# Patient Record
Sex: MALE | Race: WHITE | NOT HISPANIC OR LATINO | Employment: FULL TIME | URBAN - METROPOLITAN AREA
[De-identification: names, ages, dates, MRNs, and addresses within clinical notes are randomized per-mention and may not be internally consistent; named-entity substitution may affect disease eponyms.]

---

## 2024-02-02 ENCOUNTER — HOSPITAL ENCOUNTER (EMERGENCY)
Facility: MEDICAL CENTER | Age: 49
End: 2024-02-03
Attending: EMERGENCY MEDICINE
Payer: COMMERCIAL

## 2024-02-02 ENCOUNTER — APPOINTMENT (OUTPATIENT)
Dept: RADIOLOGY | Facility: MEDICAL CENTER | Age: 49
End: 2024-02-02
Attending: EMERGENCY MEDICINE
Payer: COMMERCIAL

## 2024-02-02 DIAGNOSIS — F23 ACUTE PSYCHOSIS (HCC): ICD-10-CM

## 2024-02-02 DIAGNOSIS — G92.8 DRUG-INDUCED ENCEPHALOPATHY: ICD-10-CM

## 2024-02-02 LAB
ALBUMIN SERPL BCP-MCNC: 4.5 G/DL (ref 3.2–4.9)
ALBUMIN/GLOB SERPL: 1.7 G/DL
ALP SERPL-CCNC: 75 U/L (ref 30–99)
ALT SERPL-CCNC: 19 U/L (ref 2–50)
AMPHET UR QL SCN: NEGATIVE
ANION GAP SERPL CALC-SCNC: 13 MMOL/L (ref 7–16)
AST SERPL-CCNC: 22 U/L (ref 12–45)
BARBITURATES UR QL SCN: NEGATIVE
BASOPHILS # BLD AUTO: 0.5 % (ref 0–1.8)
BASOPHILS # BLD: 0.03 K/UL (ref 0–0.12)
BENZODIAZ UR QL SCN: NEGATIVE
BILIRUB SERPL-MCNC: 0.5 MG/DL (ref 0.1–1.5)
BUN SERPL-MCNC: 8 MG/DL (ref 8–22)
BZE UR QL SCN: NEGATIVE
CALCIUM ALBUM COR SERPL-MCNC: 8.8 MG/DL (ref 8.5–10.5)
CALCIUM SERPL-MCNC: 9.2 MG/DL (ref 8.5–10.5)
CANNABINOIDS UR QL SCN: POSITIVE
CHLORIDE SERPL-SCNC: 103 MMOL/L (ref 96–112)
CO2 SERPL-SCNC: 24 MMOL/L (ref 20–33)
CREAT SERPL-MCNC: 0.85 MG/DL (ref 0.5–1.4)
EOSINOPHIL # BLD AUTO: 0.01 K/UL (ref 0–0.51)
EOSINOPHIL NFR BLD: 0.2 % (ref 0–6.9)
ERYTHROCYTE [DISTWIDTH] IN BLOOD BY AUTOMATED COUNT: 41.8 FL (ref 35.9–50)
ETHANOL BLD-MCNC: <10.1 MG/DL
FENTANYL UR QL: NEGATIVE
GFR SERPLBLD CREATININE-BSD FMLA CKD-EPI: 108 ML/MIN/1.73 M 2
GLOBULIN SER CALC-MCNC: 2.7 G/DL (ref 1.9–3.5)
GLUCOSE SERPL-MCNC: 99 MG/DL (ref 65–99)
HCT VFR BLD AUTO: 40.2 % (ref 42–52)
HGB BLD-MCNC: 14.2 G/DL (ref 14–18)
IMM GRANULOCYTES # BLD AUTO: 0.01 K/UL (ref 0–0.11)
IMM GRANULOCYTES NFR BLD AUTO: 0.2 % (ref 0–0.9)
LYMPHOCYTES # BLD AUTO: 2.11 K/UL (ref 1–4.8)
LYMPHOCYTES NFR BLD: 35.6 % (ref 22–41)
MCH RBC QN AUTO: 32.3 PG (ref 27–33)
MCHC RBC AUTO-ENTMCNC: 35.3 G/DL (ref 32.3–36.5)
MCV RBC AUTO: 91.4 FL (ref 81.4–97.8)
METHADONE UR QL SCN: NEGATIVE
MONOCYTES # BLD AUTO: 0.61 K/UL (ref 0–0.85)
MONOCYTES NFR BLD AUTO: 10.3 % (ref 0–13.4)
NEUTROPHILS # BLD AUTO: 3.16 K/UL (ref 1.82–7.42)
NEUTROPHILS NFR BLD: 53.2 % (ref 44–72)
NRBC # BLD AUTO: 0 K/UL
NRBC BLD-RTO: 0 /100 WBC (ref 0–0.2)
OPIATES UR QL SCN: NEGATIVE
OXYCODONE UR QL SCN: NEGATIVE
PCP UR QL SCN: NEGATIVE
PLATELET # BLD AUTO: 210 K/UL (ref 164–446)
PMV BLD AUTO: 9.6 FL (ref 9–12.9)
POTASSIUM SERPL-SCNC: 3.5 MMOL/L (ref 3.6–5.5)
PROPOXYPH UR QL SCN: NEGATIVE
PROT SERPL-MCNC: 7.2 G/DL (ref 6–8.2)
RBC # BLD AUTO: 4.4 M/UL (ref 4.7–6.1)
SODIUM SERPL-SCNC: 140 MMOL/L (ref 135–145)
TSH SERPL DL<=0.005 MIU/L-ACNC: 1.89 UIU/ML (ref 0.38–5.33)
WBC # BLD AUTO: 5.9 K/UL (ref 4.8–10.8)

## 2024-02-02 PROCEDURE — 96372 THER/PROPH/DIAG INJ SC/IM: CPT

## 2024-02-02 PROCEDURE — 80307 DRUG TEST PRSMV CHEM ANLYZR: CPT

## 2024-02-02 PROCEDURE — 80053 COMPREHEN METABOLIC PANEL: CPT

## 2024-02-02 PROCEDURE — 85025 COMPLETE CBC W/AUTO DIFF WBC: CPT

## 2024-02-02 PROCEDURE — 700111 HCHG RX REV CODE 636 W/ 250 OVERRIDE (IP): Performed by: EMERGENCY MEDICINE

## 2024-02-02 PROCEDURE — 84443 ASSAY THYROID STIM HORMONE: CPT

## 2024-02-02 PROCEDURE — 73110 X-RAY EXAM OF WRIST: CPT | Mod: LT

## 2024-02-02 PROCEDURE — 70450 CT HEAD/BRAIN W/O DYE: CPT

## 2024-02-02 PROCEDURE — 99285 EMERGENCY DEPT VISIT HI MDM: CPT

## 2024-02-02 PROCEDURE — 82077 ASSAY SPEC XCP UR&BREATH IA: CPT

## 2024-02-02 PROCEDURE — 36415 COLL VENOUS BLD VENIPUNCTURE: CPT

## 2024-02-02 RX ORDER — ZIPRASIDONE MESYLATE 20 MG/ML
20 INJECTION, POWDER, LYOPHILIZED, FOR SOLUTION INTRAMUSCULAR ONCE
Status: COMPLETED | OUTPATIENT
Start: 2024-02-02 | End: 2024-02-02

## 2024-02-02 RX ORDER — ZIPRASIDONE MESYLATE 20 MG/ML
20 INJECTION, POWDER, LYOPHILIZED, FOR SOLUTION INTRAMUSCULAR
Status: DISCONTINUED | OUTPATIENT
Start: 2024-02-02 | End: 2024-02-03 | Stop reason: HOSPADM

## 2024-02-02 RX ADMIN — ZIPRASIDONE MESYLATE 20 MG: 20 INJECTION, POWDER, LYOPHILIZED, FOR SOLUTION INTRAMUSCULAR at 15:47

## 2024-02-02 NOTE — ED NOTES
RN found pts wife phone number in his cell phone and wife called. Wife reporting they live in [place] and he came here approx 1 week ago to hopefully get a job at [employer] Wife denies psych hx. Cris Orosco [phone]

## 2024-02-02 NOTE — ED NOTES
Pt DWIGHT CELIS from [facility] Parking lot where he was running around naked yelling at people. EMS was called. Pt ran from the police. Pt was restrained. Pt arrives here not answering any questions only yelling at staff. Pt anxious.

## 2024-02-02 NOTE — ED NOTES
PATIENT UP IN ROOM NAKED AND REFUSES TO WEAR GOWN OR COVER UP WITH BLANKET. PATIENT FILLING WATER CUP IN SINK AND DRINKING OUT OF SINK.

## 2024-02-02 NOTE — ED NOTES
PATIENT GIVEN URINAL AND WAS USING IT. PT THEN BEGAN TO DRINK HIS OWN URINE. OFFICERS INTERCEPTED URINE AND REDIRECTED PT TO NOT DRINK HIS OWN URINE. PT VERBALIZED AN UNDERSTANDING.     ABLE TO COLLECT UA SAMPLE FROM LEFT OVER URINE.

## 2024-02-02 NOTE — ED NOTES
Security at bedside. Pt naked. Only belongings given to me by EMS is a cell phone. POC breathalyzer is 0.00

## 2024-02-02 NOTE — ED PROVIDER NOTES
ED Provider Note    CHIEF COMPLAINT  Chief Complaint   Patient presents with    ALOC       EXTERNAL RECORDS REVIEWED  None available    HPI/ROS  LIMITATION TO HISTORY   Select: Altered mental status / Confusion  OUTSIDE HISTORIAN(S):  Wife via telephone    Cheese Seventy-Nine is a 89 y.o. adult who presents with abnormal behavior, he was apparently found running around naked, he attempted to evade police and he was ultimately apprehended.  He is brought in here.  Wife was contacted, she reports that they live in [place], he is here interviewing for a job at [employer].  He does not use drugs.  He has no psychiatric history.  The patient does have a certain degree of psychomotor agitation, he seems to be altered and really does not provide much meaningful history    PAST MEDICAL HISTORY       SURGICAL HISTORY   has a past surgical history that includes coronary artery bypass, 1.    FAMILY HISTORY  No family history on file.    SOCIAL HISTORY  Social History     Tobacco Use    Smoking status: Not on file    Smokeless tobacco: Not on file   Substance and Sexual Activity    Alcohol use: Not on file    Drug use: Not on file    Sexual activity: Not on file       CURRENT MEDICATIONS  Home Medications    **Home medications have not yet been reviewed for this encounter**         ALLERGIES  Not on File    PHYSICAL EXAM  VITAL SIGNS: BP (!) 140/96   Pulse 96   Resp 20   Wt 113 kg (250 lb)   SpO2 95%    Constitutional: Awake, alert, psychomotor agitation  HENT: Normocephalic, no obvious evidence of acute trauma.  Eyes: No scleral icterus. Normal conjunctiva   Thorax & Lungs: Minimally tachycardic, some ecchymosis to the right lateral chest wall with an overlying abrasion otherwise symmetric and clear breath sounds bilaterally  Abdomen: The abdomen is not visibly distended. Upon palpation, I find it to be without tenderness.  No mass appreciated.  Skin: The exposed portions of skin reveal no obvious rash or other  abnormalities.  Extremities/Musculoskeletal: No obvious sign of acute trauma. No asymmetric calf tenderness or edema.   Neurologic: Awake, alert, obviously moves all 4 extremities with apparent normal strength    DIAGNOSTIC STUDIES / PROCEDURES    LABS  Results for orders placed or performed during the hospital encounter of 02/02/24   URINE DRUG SCREEN   Result Value Ref Range    Amphetamines Urine Negative Negative    Barbiturates Negative Negative    Benzodiazepines Negative Negative    Cocaine Metabolite Negative Negative    Fentanyl, Urine Negative Negative    Methadone Negative Negative    Opiates Negative Negative    Oxycodone Negative Negative    Phencyclidine -Pcp Negative Negative    Propoxyphene Negative Negative    Cannabinoid Metab Positive (A) Negative        RADIOLOGY  I have independently interpreted the diagnostic imaging associated with this visit and am waiting the final reading from the radiologist.   My preliminary interpretation is as follows: No ICH  Radiologist interpretation:   CT-HEAD W/O   Final Result      Head CT without contrast within normal limits. No evidence of acute cerebral infarction, hemorrhage or mass lesion.               COURSE & MEDICAL DECISION MAKING    ED Observation Status? Yes; I am placing the patient in to an observation status due to a diagnostic uncertainty as well as therapeutic intensity. Patient placed in observation status at 3:07 PM, 2/2/2024.     Observation plan is as follows: CT, drug screen, observation and psychiatric evaluation      INITIAL ASSESSMENT, COURSE AND PLAN  Care Narrative: This is a [id number]-old male who comes in acutely psychotic, by history obtained from the wife, he resides in [place] with his family, he is here interviewing for a job at [employer].  He was found running naked somewhere and apprehended by police.  The patient seems to be under the influence of some sort of intoxicant, he has very bizarre behavior and a certain degree of  psychomotor agitation but has no obvious focal neurologic deficits.  The plan is to use Geodon for sedation as he is interfering with his medical care and is posing a threat to his own safety and the safety of staff.  Will obtain a head CT and a drug screen.  He will be watched very closely and reevaluated all    7:39 PM patient reevaluated, is improved but still is behaving quite abnormally.  CT is negative.  Drug screen is positive for cannabinoids but no other intoxicants.  I talked to Michel, his brother.  He confirms that the patient has no psychiatric illness, there is a family history of psychiatric disease but never with the patient.  At this point, his legal hold is going to be continued overnight.  The plan will be for psychiatric evaluation in the morning.  We kept in ED observation overnight and signed out to the oncoming ERP.  I will put a single dose of Geodon intramuscular injection as a as needed order if he becomes agitated again      CRITICAL CARE  The very real possibilty of a deterioration of this patient's condition required the highest level of my preparedness for sudden, emergent intervention.  The patient required sedation with Geodon given his agitation, interference with medical care and his danger to himself and staff.  I provided critical care services, which included medication orders, frequent reevaluations of the patient's condition and response to treatment, ordering and reviewing test results, and discussing the case with various consultants.  The critical care time associated with the care of the patient was 39 minutes. Review chart for interventions. This time is exclusive of any other billable procedures.      Working diagnosis  1. Acute psychosis (HCC)           Electronically signed by: Jung Collins M.D., 2/2/2024 3:49 PM

## 2024-02-03 VITALS
BODY MASS INDEX: 37.89 KG/M2 | SYSTOLIC BLOOD PRESSURE: 157 MMHG | DIASTOLIC BLOOD PRESSURE: 89 MMHG | TEMPERATURE: 97.3 F | WEIGHT: 250 LBS | HEIGHT: 68 IN | RESPIRATION RATE: 14 BRPM | HEART RATE: 63 BPM | OXYGEN SATURATION: 95 %

## 2024-02-03 PROCEDURE — 96372 THER/PROPH/DIAG INJ SC/IM: CPT

## 2024-02-03 PROCEDURE — 700111 HCHG RX REV CODE 636 W/ 250 OVERRIDE (IP): Mod: JZ | Performed by: EMERGENCY MEDICINE

## 2024-02-03 RX ORDER — HALOPERIDOL 5 MG/ML
5 INJECTION INTRAMUSCULAR ONCE
Status: COMPLETED | OUTPATIENT
Start: 2024-02-03 | End: 2024-02-03

## 2024-02-03 RX ORDER — LORAZEPAM 2 MG/ML
2 INJECTION INTRAMUSCULAR ONCE
Status: COMPLETED | OUTPATIENT
Start: 2024-02-03 | End: 2024-02-03

## 2024-02-03 RX ORDER — DIPHENHYDRAMINE HYDROCHLORIDE 50 MG/ML
50 INJECTION INTRAMUSCULAR; INTRAVENOUS ONCE
Status: COMPLETED | OUTPATIENT
Start: 2024-02-03 | End: 2024-02-03

## 2024-02-03 RX ADMIN — DIPHENHYDRAMINE HYDROCHLORIDE 50 MG: 50 INJECTION, SOLUTION INTRAMUSCULAR; INTRAVENOUS at 02:45

## 2024-02-03 RX ADMIN — LORAZEPAM 2 MG: 2 INJECTION INTRAMUSCULAR; INTRAVENOUS at 02:47

## 2024-02-03 RX ADMIN — HALOPERIDOL LACTATE 5 MG: 5 INJECTION, SOLUTION INTRAMUSCULAR at 02:47

## 2024-02-03 NOTE — ED NOTES
PT IS SLEEPING. BREATHING IS EVEN AND UNLABORED, SKIN IS WARM AND DRY, VSS, NAD, MONITORING ONGOING.

## 2024-02-03 NOTE — ED NOTES
Checked on bed, with unlabored respirations. No safety risk noted  Sleeping  Sitter - 1:1 with continuous visual monitoring by Trained Personnel  Continued safety precaution  Josérfredis in low position, side rail up for pt safety.   No needs identified at the moment

## 2024-02-03 NOTE — ED NOTES
Pt was found doing [employer] live, security went inside the pt's room and confiscated the phone. Informed pt it was inappropriate. Charge Nurse Kimber moses

## 2024-02-03 NOTE — ED NOTES
Patient complaining of left wrist pain after being in hand cuffs earlier. ERP notified. Xray ordered.     Per ALERT ANDRES De La Paz, patient may have personal phone and corded call light.

## 2024-02-03 NOTE — DISCHARGE SUMMARY
"  ED Observation Discharge Summary    Patient:Al Orosco  Patient : 1975  Patient MRN: [id number]  Patient PCP: Pcp Pt States None    Admit Date: 2024  Discharge Date and Time: 24 9:21 AM  Discharge Diagnosis:   1. Acute psychosis (HCC)    2. Drug-induced encephalopathy        Discharge Attending: Kelvin Martinez M.D.  Discharge Service: ED Observation    ED Course  Al is a 48 y.o. male who was evaluated at [facility] for bizarre behavior.  Patient apparently ate multiple marijuana edibles and developed acute psychosis from this.  Had bizarre behavior and was brought in for this, throughout my shift patient continued to clear from psychiatric respective.  He no longer has any auditory, visual hallucinations, or delusions.  He does not recall the events of last night.  He works at [employer], he has no complaints, he denies any SI or HI.  Patient does not appear to be an acute risk to himself or others.  He did not appear to be acutely delusional at this point.  Patient discharged.  Behavioral health team recommended discharge    Discharge Exam:  BP (!) 120/100   Pulse 66   Temp 36.3 °C (97.3 °F) (Temporal)   Resp 17   Ht 1.727 m (5' 8\")   Wt 113 kg (250 lb)   SpO2 97%   BMI 38.01 kg/m² .    Constitutional: Awake and alert. Nontoxic  HENT:  Grossly normal  Eyes: Grossly normal  Neck: Normal range of motion  Cardiovascular: Normal heart rate   Thorax & Lungs: No respiratory distress  Abdomen: Nontender  Skin:  No pathologic rash.   Extremities: Well perfused  Psychiatric: Affect normal    Labs  Results for orders placed or performed during the hospital encounter of 24   URINE DRUG SCREEN   Result Value Ref Range    Amphetamines Urine Negative Negative    Barbiturates Negative Negative    Benzodiazepines Negative Negative    Cocaine Metabolite Negative Negative    Fentanyl, Urine Negative Negative    Methadone Negative Negative    Opiates Negative Negative    Oxycodone Negative Negative    " Phencyclidine -Pcp Negative Negative    Propoxyphene Negative Negative    Cannabinoid Metab Positive (A) Negative   CBC WITH DIFFERENTIAL   Result Value Ref Range    WBC 5.9 4.8 - 10.8 K/uL    RBC 4.40 (L) 4.70 - 6.10 M/uL    Hemoglobin 14.2 14.0 - 18.0 g/dL    Hematocrit 40.2 (L) 42.0 - 52.0 %    MCV 91.4 81.4 - 97.8 fL    MCH 32.3 27.0 - 33.0 pg    MCHC 35.3 32.3 - 36.5 g/dL    RDW 41.8 35.9 - 50.0 fL    Platelet Count 210 164 - 446 K/uL    MPV 9.6 9.0 - 12.9 fL    Neutrophils-Polys 53.20 44.00 - 72.00 %    Lymphocytes 35.60 22.00 - 41.00 %    Monocytes 10.30 0.00 - 13.40 %    Eosinophils 0.20 0.00 - 6.90 %    Basophils 0.50 0.00 - 1.80 %    Immature Granulocytes 0.20 0.00 - 0.90 %    Nucleated RBC 0.00 0.00 - 0.20 /100 WBC    Neutrophils (Absolute) 3.16 1.82 - 7.42 K/uL    Lymphs (Absolute) 2.11 1.00 - 4.80 K/uL    Monos (Absolute) 0.61 0.00 - 0.85 K/uL    Eos (Absolute) 0.01 0.00 - 0.51 K/uL    Baso (Absolute) 0.03 0.00 - 0.12 K/uL    Immature Granulocytes (abs) 0.01 0.00 - 0.11 K/uL    NRBC (Absolute) 0.00 K/uL   COMP METABOLIC PANEL   Result Value Ref Range    Sodium 140 135 - 145 mmol/L    Potassium 3.5 (L) 3.6 - 5.5 mmol/L    Chloride 103 96 - 112 mmol/L    Co2 24 20 - 33 mmol/L    Anion Gap 13.0 7.0 - 16.0    Glucose 99 65 - 99 mg/dL    Bun 8 8 - 22 mg/dL    Creatinine 0.85 0.50 - 1.40 mg/dL    Calcium 9.2 8.5 - 10.5 mg/dL    Correct Calcium 8.8 8.5 - 10.5 mg/dL    AST(SGOT) 22 12 - 45 U/L    ALT(SGPT) 19 2 - 50 U/L    Alkaline Phosphatase 75 30 - 99 U/L    Total Bilirubin 0.5 0.1 - 1.5 mg/dL    Albumin 4.5 3.2 - 4.9 g/dL    Total Protein 7.2 6.0 - 8.2 g/dL    Globulin 2.7 1.9 - 3.5 g/dL    A-G Ratio 1.7 g/dL   DIAGNOSTIC ALCOHOL   Result Value Ref Range    Diagnostic Alcohol <10.1 <10.1 mg/dL   TSH WITH REFLEX TO FT4   Result Value Ref Range    TSH 1.890 0.380 - 5.330 uIU/mL   ESTIMATED GFR   Result Value Ref Range    GFR (CKD-EPI) 108 >60 mL/min/1.73 m 2       Radiology  DX-WRIST-COMPLETE 3+ LEFT   Final  Result         No acute osseous abnormality.      CT-HEAD W/O   Final Result      Head CT without contrast within normal limits. No evidence of acute cerebral infarction, hemorrhage or mass lesion.             Medications:   New Prescriptions    No medications on file       My final assessment includes   1. Acute psychosis (HCC)    2. Drug-induced encephalopathy      Upon Reevaluation, the patient's condition has: Improved; and will be discharged.    Patient discharged from ED Observation status at 9:31 AM (Time) 02/03/24 (Date).     Total time spent on this ED Observation discharge encounter is > 30 Minutes    Electronically signed by: Kelvin Martinez M.D., 2/3/2024 9:21 AM

## 2024-02-03 NOTE — DISCHARGE PLANNING
Alert Team:    Pt placed on Legal Hold per ERP for inability to care for self. Pt is now cooperative, able answer basic questions, however, still somewhat altered. Per pts brother, this type of behavior is not normal for patient. At this point, ERP feels it is best to monitor patient and have him to continue to metabolize potential drug. IP psych consult ordered.    Brain Alvarez RN, ALEX

## 2024-02-03 NOTE — ED NOTES
Pt rounding, pt awake, watching tv. Not in any form of distress, no other identified request noted.     Sitter outside the room  1:1 closed observation of the pt

## 2024-02-03 NOTE — ED NOTES
Checked on bed, with unlabored respirations. Pt sleeping.   Sitter - 1:1 with continuous visual monitoring by Trained Personnel  Continued safety precaution  Josérfredis in low position, side rail up for pt safety.   No needs identified at the moment

## 2024-02-03 NOTE — ED PROVIDER NOTES
ED signout note: Following signout patient has apparently had on behavior that is consistent with his initial assessment where he was apparently acting odd and elevated police and had apparently moved here to get a job at [employer].  He does not have a history of drug use or alcohol abuse but has some elements of family psychiatric illness as described by his brother.  This is out of proportion to his normal baseline per his wife.  Urine drug screen was unremarkable, did not appear clinically intoxicated, head CT without any signs of mass.  Cannabinoids in his urine drug screen could point to some of the more psychoactive substance since such as spice.  He continues to have odd affect following signout and persistence of placement and psychiatric evaluation I have obtained some basic labs electrolytes and thyroid panel for further medical clearance.    Labs are reviewed:  Labs Reviewed   URINE DRUG SCREEN - Abnormal; Notable for the following components:       Result Value    Cannabinoid Metab Positive (*)     All other components within normal limits   CBC WITH DIFFERENTIAL - Abnormal; Notable for the following components:    RBC 4.40 (*)     Hematocrit 40.2 (*)     All other components within normal limits   COMP METABOLIC PANEL - Abnormal; Notable for the following components:    Potassium 3.5 (*)     All other components within normal limits   DIAGNOSTIC ALCOHOL   TSH WITH REFLEX TO FT4   ESTIMATED GFR     Following this, pt remains intermittently agitated, was noted at 3 AM to be talking to himself, showing signs of acute psychosis and was not following commands and needed to be medically sedated with Haldol, Benadryl, Ativan.    I discussed case with ED behavioral health team who is already placed consultation for inpatient psychiatry to see the patient with plans on reaching out to family members, serial reassessments and the possibility of being discharged home versus inpatient assessment.

## 2024-02-03 NOTE — ED NOTES
Bedside report received from off going RN Ashia, assumed care of patient.  POC discussed with patient. Call light within reach, all needs addressed at this time.       Fall risk interventions in place: Not Applicable (all applicable per [place] Fall risk assessment)   Continuous monitoring: Not Applicable   IVF/IV medications: Not Applicable   Oxygen: Room Air  Bedside sitter: Pt on L2k SI with 1:1 sitter Allyson (name), Report given to sitter, checklist completed, and checklist completed, stop sign in doorway  Isolation: Not Applicable

## 2024-02-03 NOTE — ED NOTES
Informed Dr Serrano pt wanting to go out from his room, wanting to go home.  Been talking flight of ideas.Called security    Sitter 1:1 in direct view of the pt

## 2024-02-03 NOTE — ED NOTES
Given due medications with the security at bedside, but pt did cooperate while this RN and [facility] EMT Yolanda gave the medication intramuscularly.

## 2024-02-03 NOTE — DISCHARGE PLANNING
"ALERT team  note:  48 year old male admitted 2/4/24, legal hold, inability to care for self; this AM,  pt alert, oriented x 4; calm; cooperative; pleasant; with organized thoughts and behaviors; no delusions, paranoia, hallucinations noted; insight, judgment adequate; currently denies SI, HI, or self-harm ideation; future-oriented; denies h/o self-harm or suicide attempt; states he recently moved to [place] from [place] approx 1 week ago for a new job opportunity at [employer]; states he is residing in a rental property and his wife and children are still in [place] until their house there is sold, and then they will relocate here; states he ingested an THC \"edibles\" yesterday to relax and does not remember much of his disorganized thoughts and behaviors from yesterday; other than the edibles yesterday, pt denies current substance use; UDS + THC; states current psych med includes Cymbalta 60 mg PO daily which he is compliant with; denies current outpt MH providers or h/o inpt MH/CD tx; states today he plans to retrieve his car, go home, and rest;  writer RN updated [facility] ERP Dr. Martinez re: pt's current status; writer RN reviewed community MH resources with  pt, with written information given, including [facility],  [facility], [facility], [facility] (for future resources if he receives insurance as an employee at [employer]),, and [facility], [facility], [facility], [facility]. As pt does not currently have an active insurance plan; pt verbalized understanding; pt to DC to self today to his car with a taxi voucher given  "

## 2024-02-03 NOTE — DISCHARGE INSTRUCTIONS
You develop psychosis last night secondary to severe marijuana intoxication.  Thankfully your workup was reassuring.  I would recommend not taking edibles in the future.

## 2024-02-03 NOTE — ED NOTES
Bedside report received from off going RN: Nurys, assumed care of patient.      Fall risk interventions in place: Not Applicable (all applicable per [place] Fall risk assessment)   Continuous monitoring: Not Applicable   IVF/IV medications: Not Applicable   Oxygen: Room Air  Bedside sitter: Report given to sitter for safety, not on L2K.   Isolation: Not Applicable

## 2024-02-03 NOTE — ED TRIAGE NOTES
"Chief Complaint   Patient presents with    ALOC     PT WIFE STATES PT SHOWED UP AT HOME AND WAS ACTING STRANGE AND WAS NAKED. WIFE HAS NO CLUE WHY PATIENT IS ACTING THIS WAY. WIFE STATES PT WITH HX OF BYPASS AND TAKING CYMBLATA.            PT ARRIVED VIA EMS. SEE ABOVE. AAOX2.    EMS BLOOD GLUCOSE: 167      BP (!) 189/92   Pulse 100   Temp 36.3 °C (97.3 °F) (Temporal)   Resp 30   Ht 1.727 m (5' 8\")   Wt 113 kg (250 lb)   SpO2 97%   BMI 38.01 kg/m²     "

## 2024-02-03 NOTE — ED NOTES
Cab called for the pt.   Discharge instructions given to pt including follow up with pcp or returning if no improvement of symptoms or to return if worse. Questions answered by RN. Denies any new complaints. Discharged w/stable vitals and able to ambulate to the lobby with steady gait. Cellphone given back to pt. Clothings provided to pt.